# Patient Record
Sex: FEMALE | Race: WHITE | NOT HISPANIC OR LATINO | Employment: PART TIME | URBAN - METROPOLITAN AREA
[De-identification: names, ages, dates, MRNs, and addresses within clinical notes are randomized per-mention and may not be internally consistent; named-entity substitution may affect disease eponyms.]

---

## 2017-09-25 ENCOUNTER — TRANSCRIBE ORDERS (OUTPATIENT)
Dept: ADMINISTRATIVE | Facility: HOSPITAL | Age: 62
End: 2017-09-25

## 2017-09-25 DIAGNOSIS — Z12.31 ENCOUNTER FOR MAMMOGRAM TO ESTABLISH BASELINE MAMMOGRAM: Primary | ICD-10-CM

## 2017-10-03 ENCOUNTER — HOSPITAL ENCOUNTER (OUTPATIENT)
Dept: RADIOLOGY | Age: 62
Discharge: HOME | End: 2017-10-03
Payer: COMMERCIAL

## 2017-10-03 DIAGNOSIS — Z12.31 ENCOUNTER FOR MAMMOGRAM TO ESTABLISH BASELINE MAMMOGRAM: ICD-10-CM

## 2017-10-03 PROCEDURE — G0202 SCR MAMMO BI INCL CAD: HCPCS

## 2018-06-02 ENCOUNTER — OFFICE VISIT (OUTPATIENT)
Dept: URGENT CARE | Facility: MEDICAL CENTER | Age: 63
End: 2018-06-02
Payer: COMMERCIAL

## 2018-06-02 VITALS
WEIGHT: 186 LBS | TEMPERATURE: 98.3 F | DIASTOLIC BLOOD PRESSURE: 88 MMHG | SYSTOLIC BLOOD PRESSURE: 149 MMHG | OXYGEN SATURATION: 98 % | HEART RATE: 80 BPM | RESPIRATION RATE: 18 BRPM

## 2018-06-02 DIAGNOSIS — L23.7 POISON IVY DERMATITIS: Primary | ICD-10-CM

## 2018-06-02 PROCEDURE — G0382 LEV 3 HOSP TYPE B ED VISIT: HCPCS | Performed by: PHYSICIAN ASSISTANT

## 2018-06-02 RX ORDER — DIPHENHYDRAMINE HCL 25 MG
25 TABLET ORAL EVERY 6 HOURS PRN
COMMUNITY

## 2018-06-02 RX ORDER — PREDNISONE 10 MG/1
10 TABLET ORAL 2 TIMES DAILY WITH MEALS
Qty: 34 TABLET | Refills: 0 | Status: SHIPPED | OUTPATIENT
Start: 2018-06-02 | End: 2018-06-12

## 2018-06-02 RX ORDER — IRBESARTAN 300 MG/1
TABLET ORAL
COMMUNITY
Start: 2018-05-29

## 2018-06-02 NOTE — PROGRESS NOTES
[address] [facility]        NAME: Elijah Lang is a 65 y o  female  : 1955    MRN: [id number]  DATE: 2018  TIME: 5:16 PM    Assessment and Plan   Poison ivy dermatitis [L23 7]  1  Poison ivy dermatitis  predniSONE 10 mg tablet         Patient Instructions       Follow up with PCP in 3-5 days  Proceed to  ER if symptoms worsen  Chief Complaint     Chief Complaint   Patient presents with    Rash     Poison Ivy times one week, no change with OTC cream          History of Present Illness       The patient is a 62-year-old female presents with an itchy rash on both upper arms, chest and neck region x1 week  She states she was exposed to poison ivy in his using topical caliber might lotion as well as Benadryl with no improvement of her symptoms  Patient states she started noticing blisters on her right forearm earlier today, she denies any skin drainage or weeping  Review of Systems   Review of Systems   Constitutional: Negative  Skin: Positive for rash  Current Medications       Current Outpatient Prescriptions:     diphenhydrAMINE (BENADRYL) 25 mg tablet, Take 25 mg by mouth every 6 (six) hours as needed for itching, Disp: , Rfl:     irbesartan (AVAPRO) 300 mg tablet, , Disp: , Rfl:     predniSONE 10 mg tablet, Take 1 tablet (10 mg total) by mouth 2 (two) times a day with meals for 10 days 3 PO BID x 3 days, then 2 PO BID x 3 days, then 1 daily x 4 days, Disp: 34 tablet, Rfl: 0    Current Allergies     Allergies as of 2018    (Not on File)            The following portions of the patient's history were reviewed and updated as appropriate: allergies, current medications, past family history, past medical history, past social history, past surgical history and problem list      Past Medical History:   Diagnosis Date    Hypertension        Past Surgical History:   Procedure Laterality Date     SECTION         No family history on file        Medications have been verified  Objective   /88   Pulse 80   Temp 98 3 °F (36 8 °C) (Temporal)   Resp 18   Wt 84 4 kg (186 lb)   SpO2 98%        Physical Exam     Physical Exam   Constitutional: She appears well-developed and well-nourished  No distress  HENT:   Head: Normocephalic and atraumatic  Right Ear: Tympanic membrane normal    Left Ear: Tympanic membrane normal    Nose: Nose normal    Mouth/Throat: Uvula is midline, oropharynx is clear and moist and mucous membranes are normal    Cardiovascular: Normal rate, regular rhythm and normal heart sounds  No murmur heard    Pulmonary/Chest: Effort normal and breath sounds normal    Skin:

## 2018-06-02 NOTE — PATIENT INSTRUCTIONS
1  Take Prednisone 10mg  3 tablets twice daily x 3 days, then 2 tablets twice daily x 3 days, then 1 daily x 4 days  2  Benadryl as needed for itching  3  Follow-up with PCP if symptoms persist  4  Monitor blood pressure while on prednisone

## 2019-01-30 ENCOUNTER — TRANSCRIBE ORDERS (OUTPATIENT)
Dept: ADMINISTRATIVE | Facility: HOSPITAL | Age: 64
End: 2019-01-30

## 2019-01-30 DIAGNOSIS — Z12.31 VISIT FOR SCREENING MAMMOGRAM: Primary | ICD-10-CM

## 2019-02-20 ENCOUNTER — HOSPITAL ENCOUNTER (OUTPATIENT)
Dept: RADIOLOGY | Age: 64
Discharge: HOME | End: 2019-02-20
Payer: COMMERCIAL

## 2019-02-20 VITALS — HEIGHT: 64 IN | WEIGHT: 186 LBS | BODY MASS INDEX: 31.76 KG/M2

## 2019-02-20 DIAGNOSIS — Z12.31 VISIT FOR SCREENING MAMMOGRAM: ICD-10-CM

## 2019-02-20 PROCEDURE — 77067 SCR MAMMO BI INCL CAD: CPT

## 2020-02-05 ENCOUNTER — TRANSCRIBE ORDERS (OUTPATIENT)
Dept: ADMINISTRATIVE | Facility: HOSPITAL | Age: 65
End: 2020-02-05

## 2020-02-05 DIAGNOSIS — Z12.31 SCREENING MAMMOGRAM FOR HIGH-RISK PATIENT: Primary | ICD-10-CM

## 2020-05-26 ENCOUNTER — HOSPITAL ENCOUNTER (OUTPATIENT)
Dept: RADIOLOGY | Age: 65
Discharge: HOME | End: 2020-05-26
Payer: COMMERCIAL

## 2020-05-26 VITALS — WEIGHT: 170 LBS | HEIGHT: 64 IN | BODY MASS INDEX: 29.02 KG/M2

## 2020-05-26 DIAGNOSIS — Z12.31 SCREENING MAMMOGRAM FOR HIGH-RISK PATIENT: ICD-10-CM

## 2020-05-26 PROCEDURE — 77063 BREAST TOMOSYNTHESIS BI: CPT

## 2020-05-26 PROCEDURE — 77067 SCR MAMMO BI INCL CAD: CPT

## 2021-10-04 ENCOUNTER — HOSPITAL ENCOUNTER (OUTPATIENT)
Dept: RADIOLOGY | Age: 66
Discharge: HOME | End: 2021-10-04
Payer: COMMERCIAL

## 2021-10-04 VITALS — HEIGHT: 64 IN | WEIGHT: 170 LBS | BODY MASS INDEX: 29.02 KG/M2

## 2021-10-04 DIAGNOSIS — Z12.31 ENCOUNTER FOR SCREENING MAMMOGRAM FOR MALIGNANT NEOPLASM OF BREAST: ICD-10-CM

## 2021-10-04 PROCEDURE — 77063 BREAST TOMOSYNTHESIS BI: CPT

## 2021-10-04 PROCEDURE — 77067 SCR MAMMO BI INCL CAD: CPT

## 2022-11-02 ENCOUNTER — HOSPITAL ENCOUNTER (OUTPATIENT)
Dept: RADIOLOGY | Age: 67
Discharge: HOME | End: 2022-11-02

## 2022-11-02 VITALS — BODY MASS INDEX: 29.88 KG/M2 | HEIGHT: 64 IN | WEIGHT: 175 LBS

## 2022-11-02 DIAGNOSIS — Z12.31 ENCOUNTER FOR SCREENING MAMMOGRAM FOR MALIGNANT NEOPLASM OF BREAST: ICD-10-CM

## 2023-04-24 NOTE — PROGRESS NOTES
Assessment/Plan   Problem List Items Addressed This Visit    None  Visit Diagnoses     Well woman exam    -  Primary    Relevant Orders    Liquid-based pap, screening    Screening for osteoporosis        Relevant Orders    DXA bone density spine hip and pelvis    Postmenopause        Relevant Orders    DXA bone density spine hip and pelvis          Discussion  I have discussed the importance of monthly self-breast exams, exercise and healthy diet as well as adequate intake of calcium and vitamin D  Encourage at least 1200 mg calcium citrate + 2000 IUs vitamin D3 divided through diet and supplement throughout the day  STI testing - declines     The current ASCCP guidelines were reviewed  Patient's last pap was 2013 NILM/HPV- and therefore, a pap with HPV cotesting is  indicated at this time  Mammogram is UTD  Colorectal screening is UTD  I have reviewed the patient's risk factors for osteoporosis and ordered a dexa scan if applicable  Pt to check with insurance for coverage  All questions have been answered to her satisfaction  RTO for APE or sooner if needed      Subjective     HPI   Shaylee Doss is a 78 y o  female who presents for annual well woman exam      ; Denies  bleeding    No vulvar itch/burn; No vaginal itch/burn; No abn discharge or odor; No urinary sx - burning/pain/frequency/hematuria    (-) SBEs - no concerns with breast  No family history of breast CA  No abd/pelvic pain or HAs;     +vaginal dryness  No other menopausal symptoms: No hot flashes/night sweats, problems with intercourse,  sleeping well  Pt is sexually active in a mutually monog/ sexual relationship; No issues with intercourse; She declines sti/hiv/hep testing; Feels safe at home  Condom use: no    (+) PCP for routine Bw/care;     Last Pap - 2013 NILM/HPV-  History of abnormal Pap smear: no  Last mammo - 11/2022 [facility]1  History of abnormal mammogram: no   Last colonoscopy - reports 2018; f/u in 10 years  Last Dexa scan - never had    Review of Systems   Constitutional: Negative for fatigue  Eyes: Negative for photophobia and visual disturbance  Respiratory: Negative for cough and shortness of breath  Cardiovascular: Negative for chest pain and palpitations  Gastrointestinal: Negative for abdominal pain, blood in stool, constipation, diarrhea, nausea and rectal pain  Genitourinary: Negative for dyspareunia, dysuria, flank pain, frequency, genital sores, menstrual problem, pelvic pain, urgency, vaginal bleeding, vaginal discharge and vaginal pain  Musculoskeletal: Negative for arthralgias and back pain  Skin: Negative for rash  Neurological: Negative for weakness and headaches         The following portions of the patient's history were reviewed and updated as appropriate: allergies, current medications, past family history, past medical history, past social history, past surgical history and problem list          OB History        2    Para   2    Term   2            AB        Living   2       SAB        IAB        Ectopic        Multiple        Live Births   2                 Past Medical History:   Diagnosis Date   • Hypertension        Past Surgical History:   Procedure Laterality Date   •  SECTION         Family History   Problem Relation Age of Onset   • No Known Problems Mother    • Lung cancer Father 52   • No Known Problems Sister    • No Known Problems Sister    • No Known Problems Daughter    • No Known Problems Maternal Grandmother    • No Known Problems Maternal Grandfather    • No Known Problems Paternal Grandmother    • No Known Problems Paternal Grandfather    • No Known Problems Maternal Aunt    • No Known Problems Paternal Aunt    • No Known Problems Paternal Aunt    • No Known Problems Paternal Aunt    • No Known Problems Paternal Aunt        Social History     Socioeconomic History   • Marital status: /Civil Union     Spouse name: Not on "file   • Number of children: Not on file   • Years of education: Not on file   • Highest education level: Not on file   Occupational History   • Not on file   Tobacco Use   • Smoking status: Never   • Smokeless tobacco: Never   Vaping Use   • Vaping Use: Never used   Substance and Sexual Activity   • Alcohol use: Yes     Comment: rarely   • Drug use: Never   • Sexual activity: Yes     Partners: Male   Other Topics Concern   • Not on file   Social History Narrative   • Not on file     Social Determinants of Health     Financial Resource Strain: Not on file   Food Insecurity: Not on file   Transportation Needs: Not on file   Physical Activity: Not on file   Stress: Not on file   Social Connections: Not on file   Intimate Partner Violence: Not on file   Housing Stability: Not on file         Current Outpatient Medications:   •  amLODIPine (NORVASC) 2 5 mg tablet, Take 2 5 mg by mouth daily, Disp: , Rfl:   •  Ascorbic Acid (vitamin C) 1000 MG tablet, Take 1,000 mg by mouth daily, Disp: , Rfl:   •  calcium carbonate-vitamin D 250 mg-3 125 mcg per tablet, Take 1 tablet by mouth daily, Disp: , Rfl:   •  diphenhydrAMINE (BENADRYL) 25 mg tablet, Take 25 mg by mouth every 6 (six) hours as needed for itching, Disp: , Rfl:   •  EPINEPHrine (EPIPEN) 0 3 mg/0 3 mL SOAJ, inject 0 3 milliliters ( 0 3 milligrams ) intramuscularly if needed for ANAPHYLAXIS, Disp: , Rfl:   •  irbesartan (AVAPRO) 300 mg tablet, , Disp: , Rfl:   •  Multiple Vitamins-Minerals (ONE-A-DAY WOMENS 50+ ADVANTAGE PO), Take by mouth, Disp: , Rfl:   •  Omega-3 Fatty Acids (fish oil) 1,000 mg, Take 1,000 mg by mouth daily, Disp: , Rfl:   •  Red Yeast Rice 600 MG CAPS, Take by mouth, Disp: , Rfl:     Allergies   Allergen Reactions   • Bee Venom Swelling   • Benazepril Cough       Objective   Vitals:    04/27/23 1044   BP: 158/100   BP Location: Left arm   Patient Position: Sitting   Cuff Size: Standard   Weight: 81 kg (178 lb 9 6 oz)   Height: 5' 4\" (1 626 m) " Physical Exam  Vitals and nursing note reviewed  Constitutional:       Appearance: Normal appearance  She is well-developed and normal weight  HENT:      Head: Normocephalic and atraumatic  Cardiovascular:      Rate and Rhythm: Normal rate and regular rhythm  Heart sounds: Normal heart sounds  Pulmonary:      Effort: Pulmonary effort is normal       Breath sounds: Normal breath sounds  Chest:   Breasts:     Breasts are symmetrical       Right: No inverted nipple, mass, nipple discharge, skin change or tenderness  Left: No inverted nipple, mass, nipple discharge, skin change or tenderness  Abdominal:      General: Abdomen is flat  Bowel sounds are normal       Palpations: Abdomen is soft  Tenderness: There is no right CVA tenderness or left CVA tenderness  Genitourinary:     General: Normal vulva  Exam position: Lithotomy position  Pubic Area: No rash or pubic lice  Labia:         Right: No rash or tenderness  Left: No rash or tenderness  Urethra: No urethral pain  Vagina: Normal  No vaginal discharge  Cervix: Normal       Uterus: Normal        Adnexa: Right adnexa normal and left adnexa normal         Right: No mass  Left: No mass  Rectum: Normal  Guaiac result negative  No external hemorrhoid  Comments: Vaginal atrophy noted related to postmenopausal state  Musculoskeletal:         General: Normal range of motion  Cervical back: Normal range of motion  Right lower leg: No edema  Left lower leg: No edema  Lymphadenopathy:      Cervical: No cervical adenopathy  Upper Body:      Right upper body: No supraclavicular or axillary adenopathy  Left upper body: No supraclavicular or axillary adenopathy  Lower Body: No right inguinal adenopathy  No left inguinal adenopathy  Skin:     General: Skin is warm and dry     Neurological:      Mental Status: She is alert and oriented to person, place, and time    Psychiatric:         Mood and Affect: Mood normal          Behavior: Behavior normal          Thought Content: Thought content normal          Judgment: Judgment normal          There are no Patient Instructions on file for this visit

## 2023-04-27 ENCOUNTER — OFFICE VISIT (OUTPATIENT)
Dept: OBGYN CLINIC | Facility: CLINIC | Age: 68
End: 2023-04-27

## 2023-04-27 VITALS
SYSTOLIC BLOOD PRESSURE: 146 MMHG | DIASTOLIC BLOOD PRESSURE: 80 MMHG | HEIGHT: 64 IN | BODY MASS INDEX: 30.39 KG/M2 | WEIGHT: 178 LBS

## 2023-04-27 DIAGNOSIS — Z01.419 WELL WOMAN EXAM: Primary | ICD-10-CM

## 2023-04-27 DIAGNOSIS — Z13.820 SCREENING FOR OSTEOPOROSIS: ICD-10-CM

## 2023-04-27 DIAGNOSIS — Z78.0 POSTMENOPAUSE: ICD-10-CM

## 2023-04-27 RX ORDER — CHLORAL HYDRATE 500 MG
1000 CAPSULE ORAL DAILY
COMMUNITY

## 2023-04-27 RX ORDER — MULTIVIT WITH MINERALS/LUTEIN
1000 TABLET ORAL DAILY
COMMUNITY

## 2023-04-27 RX ORDER — EPINEPHRINE 0.3 MG/.3ML
INJECTION SUBCUTANEOUS
COMMUNITY
Start: 2023-03-22

## 2023-04-27 RX ORDER — AMLODIPINE BESYLATE 2.5 MG/1
2.5 TABLET ORAL DAILY
COMMUNITY
Start: 2023-04-16

## 2023-04-27 RX ORDER — AMPICILLIN TRIHYDRATE 250 MG
CAPSULE ORAL
COMMUNITY

## 2023-05-02 LAB
HPV HR 12 DNA CVX QL NAA+PROBE: NEGATIVE
HPV16 DNA CVX QL NAA+PROBE: NEGATIVE
HPV18 DNA CVX QL NAA+PROBE: NEGATIVE

## 2023-05-05 LAB
LAB AP GYN PRIMARY INTERPRETATION: NORMAL
Lab: NORMAL

## 2023-11-03 ENCOUNTER — HOSPITAL ENCOUNTER (OUTPATIENT)
Dept: RADIOLOGY | Age: 68
Discharge: HOME | End: 2023-11-03
Payer: COMMERCIAL

## 2023-11-03 VITALS — WEIGHT: 178 LBS | HEIGHT: 64 IN | BODY MASS INDEX: 30.39 KG/M2

## 2023-11-03 DIAGNOSIS — Z12.31 VISIT FOR SCREENING MAMMOGRAM: ICD-10-CM

## 2023-11-03 PROCEDURE — 77063 BREAST TOMOSYNTHESIS BI: CPT

## 2023-11-03 PROCEDURE — 77067 SCR MAMMO BI INCL CAD: CPT

## 2024-12-26 ENCOUNTER — HOSPITAL ENCOUNTER (OUTPATIENT)
Dept: RADIOLOGY | Facility: MEDICAL CENTER | Age: 69
Discharge: HOME | End: 2024-12-26
Payer: COMMERCIAL

## 2024-12-26 VITALS — HEIGHT: 64 IN | WEIGHT: 178 LBS | BODY MASS INDEX: 30.39 KG/M2

## 2024-12-26 DIAGNOSIS — Z12.31 ENCOUNTER FOR SCREENING MAMMOGRAM FOR BREAST CANCER: ICD-10-CM

## 2024-12-26 PROCEDURE — 77063 BREAST TOMOSYNTHESIS BI: CPT

## 2024-12-26 PROCEDURE — 77067 SCR MAMMO BI INCL CAD: CPT
